# Patient Record
Sex: MALE | NOT HISPANIC OR LATINO | URBAN - METROPOLITAN AREA
[De-identification: names, ages, dates, MRNs, and addresses within clinical notes are randomized per-mention and may not be internally consistent; named-entity substitution may affect disease eponyms.]

---

## 2024-08-12 ENCOUNTER — LAB OUTSIDE AN ENCOUNTER (OUTPATIENT)
Dept: URBAN - METROPOLITAN AREA CLINIC 23 | Facility: CLINIC | Age: 84
End: 2024-08-12

## 2024-08-12 ENCOUNTER — OFFICE VISIT (OUTPATIENT)
Dept: URBAN - METROPOLITAN AREA CLINIC 23 | Facility: CLINIC | Age: 84
End: 2024-08-12
Payer: MEDICARE

## 2024-08-12 VITALS
SYSTOLIC BLOOD PRESSURE: 137 MMHG | DIASTOLIC BLOOD PRESSURE: 81 MMHG | HEIGHT: 72 IN | HEART RATE: 62 BPM | WEIGHT: 194 LBS | BODY MASS INDEX: 26.28 KG/M2 | TEMPERATURE: 97.9 F

## 2024-08-12 DIAGNOSIS — R63.4 WEIGHT LOSS: ICD-10-CM

## 2024-08-12 DIAGNOSIS — Z79.82 ON ASPIRIN AT HOME: ICD-10-CM

## 2024-08-12 DIAGNOSIS — K92.1 MELENA: ICD-10-CM

## 2024-08-12 DIAGNOSIS — K59.03 DRUG-INDUCED CONSTIPATION: ICD-10-CM

## 2024-08-12 PROCEDURE — 99205 OFFICE O/P NEW HI 60 MIN: CPT | Performed by: INTERNAL MEDICINE

## 2024-08-12 RX ORDER — PANTOPRAZOLE SODIUM 40 MG/1
1 TABLET TABLET, DELAYED RELEASE ORAL TWICE A DAY
Qty: 180 TABLET | Refills: 1 | OUTPATIENT
Start: 2024-08-12

## 2024-08-12 NOTE — HPI-TODAY'S VISIT:
84-year-old male presents for 2 months history of darker stool.  Denies abdominal pain.  Nausea vomiting but no evidence of blood in the vomitus.  Last emesis was a month ago. He also reports 30 pounds of weight loss. He moves bowel every 3-4 days while using oxycodone for chronic lower back pain.  Recently he started Movantik without significant effect. No previous EGD.  Last colonoscopy 5 years ago, history of polyps. No NSAID use other than aspirin. History of coronary disease, CABG surgery 25 years ago, recent cardiac cath showed blockage of the vessel but they could not do stent placement due to technical difficulty.  Following Dr. Mariano. He takes Pepcid as needed only, not frequently.

## 2024-08-12 NOTE — PHYSICAL EXAM GASTROINTESTINAL
Abdomen- soft, mild tenderness in the epigastric, nondistended , no guarding or rigidity , no masses palpable , normal bowel sounds

## 2024-08-13 ENCOUNTER — TELEPHONE ENCOUNTER (OUTPATIENT)
Dept: URBAN - METROPOLITAN AREA CLINIC 23 | Facility: CLINIC | Age: 84
End: 2024-08-13

## 2024-08-14 ENCOUNTER — TELEPHONE ENCOUNTER (OUTPATIENT)
Dept: URBAN - METROPOLITAN AREA CLINIC 6 | Facility: CLINIC | Age: 84
End: 2024-08-14

## 2024-08-14 ENCOUNTER — LAB OUTSIDE AN ENCOUNTER (OUTPATIENT)
Dept: URBAN - METROPOLITAN AREA CLINIC 6 | Facility: CLINIC | Age: 84
End: 2024-08-14

## 2024-08-15 ENCOUNTER — TELEPHONE ENCOUNTER (OUTPATIENT)
Dept: URBAN - METROPOLITAN AREA CLINIC 23 | Facility: CLINIC | Age: 84
End: 2024-08-15

## 2024-08-26 ENCOUNTER — TELEPHONE ENCOUNTER (OUTPATIENT)
Dept: URBAN - METROPOLITAN AREA CLINIC 23 | Facility: CLINIC | Age: 84
End: 2024-08-26

## 2024-08-29 ENCOUNTER — TELEPHONE ENCOUNTER (OUTPATIENT)
Dept: URBAN - METROPOLITAN AREA CLINIC 78 | Facility: CLINIC | Age: 84
End: 2024-08-29

## 2024-08-29 ENCOUNTER — DASHBOARD ENCOUNTERS (OUTPATIENT)
Age: 84
End: 2024-08-29

## 2024-10-04 ENCOUNTER — OFFICE VISIT (OUTPATIENT)
Dept: URBAN - METROPOLITAN AREA MEDICAL CENTER 27 | Facility: MEDICAL CENTER | Age: 84
End: 2024-10-04